# Patient Record
Sex: FEMALE | Race: AMERICAN INDIAN OR ALASKA NATIVE
[De-identification: names, ages, dates, MRNs, and addresses within clinical notes are randomized per-mention and may not be internally consistent; named-entity substitution may affect disease eponyms.]

---

## 2021-08-26 ENCOUNTER — HOSPITAL ENCOUNTER (INPATIENT)
Dept: HOSPITAL 5 - ED | Age: 51
LOS: 3 days | Discharge: HOME | DRG: 177 | End: 2021-08-29
Attending: INTERNAL MEDICINE | Admitting: HOSPITALIST
Payer: SELF-PAY

## 2021-08-26 DIAGNOSIS — I25.2: ICD-10-CM

## 2021-08-26 DIAGNOSIS — U07.1: Primary | ICD-10-CM

## 2021-08-26 DIAGNOSIS — E87.1: ICD-10-CM

## 2021-08-26 DIAGNOSIS — M06.9: ICD-10-CM

## 2021-08-26 DIAGNOSIS — J12.82: ICD-10-CM

## 2021-08-26 DIAGNOSIS — I25.10: ICD-10-CM

## 2021-08-26 DIAGNOSIS — I10: ICD-10-CM

## 2021-08-26 LAB
ALBUMIN SERPL-MCNC: 3.7 G/DL (ref 3.9–5)
ALT SERPL-CCNC: 10 UNITS/L (ref 7–56)
BASOPHILS # (AUTO): 0 K/MM3 (ref 0–0.1)
BASOPHILS NFR BLD AUTO: 0.4 % (ref 0–1.8)
BILIRUB DIRECT SERPL-MCNC: < 0.2 MG/DL (ref 0–0.2)
BUN SERPL-MCNC: 9 MG/DL (ref 7–17)
BUN/CREAT SERPL: 13 %
CALCIUM SERPL-MCNC: 9.1 MG/DL (ref 8.4–10.2)
CRP SERPL-MCNC: 5 MG/DL (ref 0–1.3)
EOSINOPHIL # BLD AUTO: 0 K/MM3 (ref 0–0.4)
EOSINOPHIL NFR BLD AUTO: 0 % (ref 0–4.3)
HCT VFR BLD CALC: 36.9 % (ref 30.3–42.9)
HEMOLYSIS INDEX: 8
HGB BLD-MCNC: 11.8 GM/DL (ref 10.1–14.3)
LYMPHOCYTES # BLD AUTO: 0.7 K/MM3 (ref 1.2–5.4)
LYMPHOCYTES NFR BLD AUTO: 19.4 % (ref 13.4–35)
MCHC RBC AUTO-ENTMCNC: 32 % (ref 30–34)
MCV RBC AUTO: 81 FL (ref 79–97)
MONOCYTES # (AUTO): 0.2 K/MM3 (ref 0–0.8)
MONOCYTES % (AUTO): 6.6 % (ref 0–7.3)
PLATELET # BLD: 199 K/MM3 (ref 140–440)
RBC # BLD AUTO: 4.54 M/MM3 (ref 3.65–5.03)

## 2021-08-26 PROCEDURE — 84145 PROCALCITONIN (PCT): CPT

## 2021-08-26 PROCEDURE — 80053 COMPREHEN METABOLIC PANEL: CPT

## 2021-08-26 PROCEDURE — 80048 BASIC METABOLIC PNL TOTAL CA: CPT

## 2021-08-26 PROCEDURE — 83615 LACTATE (LD) (LDH) ENZYME: CPT

## 2021-08-26 PROCEDURE — 83735 ASSAY OF MAGNESIUM: CPT

## 2021-08-26 PROCEDURE — 71046 X-RAY EXAM CHEST 2 VIEWS: CPT

## 2021-08-26 PROCEDURE — 85025 COMPLETE CBC W/AUTO DIFF WBC: CPT

## 2021-08-26 PROCEDURE — 86140 C-REACTIVE PROTEIN: CPT

## 2021-08-26 PROCEDURE — 82728 ASSAY OF FERRITIN: CPT

## 2021-08-26 PROCEDURE — 36415 COLL VENOUS BLD VENIPUNCTURE: CPT

## 2021-08-26 PROCEDURE — 80076 HEPATIC FUNCTION PANEL: CPT

## 2021-08-26 PROCEDURE — 85379 FIBRIN DEGRADATION QUANT: CPT

## 2021-08-26 PROCEDURE — U0003 INFECTIOUS AGENT DETECTION BY NUCLEIC ACID (DNA OR RNA); SEVERE ACUTE RESPIRATORY SYNDROME CORONAVIRUS 2 (SARS-COV-2) (CORONAVIRUS DISEASE [COVID-19]), AMPLIFIED PROBE TECHNIQUE, MAKING USE OF HIGH THROUGHPUT TECHNOLOGIES AS DESCRIBED BY CMS-2020-01-R: HCPCS

## 2021-08-26 RX ADMIN — Medication SCH MG: at 10:30

## 2021-08-26 RX ADMIN — OXYCODONE HYDROCHLORIDE AND ACETAMINOPHEN SCH MG: 500 TABLET ORAL at 10:30

## 2021-08-26 RX ADMIN — PANTOPRAZOLE SODIUM SCH MG: 40 TABLET, DELAYED RELEASE ORAL at 10:30

## 2021-08-26 NOTE — XRAY REPORT
CHEST 2 VIEWS 



INDICATION / CLINICAL INFORMATION:

sob and chest pain.





FINDINGS:



SUPPORT DEVICES: None.



HEART / MEDIASTINUM: No significant abnormality. 



LUNGS / PLEURA: Severe bilateral airspace pneumonia, lower lobe predominant. No significant pleural e
ffusion.



Signer Name: Josue Kline MD 

Signed: 8/26/2021 3:57 AM

Workstation Name: [id number]

## 2021-08-26 NOTE — HISTORY AND PHYSICAL REPORT
History of Present Illness


Date of examination: 21


Date of admission: 


21 05:59





Chief complaint: 


Fever, worsening shortness of breath, chest pain and generalized body aches for 

the last 1 week





History of present illness: 


51-year-old  female patient with significant past medical history of rheumatoid 

arthritis on Humira, hypertension , history of coronary artery disease/STEMI, 

undergoing evaluation for possible lupus presented to the emergency room with 

generalized body pains fever and worsening shortness of breath and chest pain of

1 week duration Patient gives history of COVID-19 exposures with her 10-year-old

son who was positive


On presentation patient is febrile with temperature of 101 F, initial chest x-

ray findings consistent with bilateral pneumonia


Patient also has mild leukopenia probably due to viral syndrome and mild 

hyponatremia


Patient complains of vague chest pain


Denies nausea vomiting or abdominal pain


Denies headache dizziness, denies palpitation

















Past History


Past Medical History: arthritis, hypertension, other (Rheumatoid arthritis, 

scoliosis)


Past Surgical History:  (X2)


Social history: denies: smoking, alcohol abuse, prescription drug abuse


Family history: no significant family history





Medications and Allergies


                                    Allergies











Allergy/AdvReac Type Severity Reaction Status Date / Time


 


No Known Allergies Allergy   Verified 21 03:05














Review of Systems


Constitutional: fever, fatigue, weakness


Ears, nose, mouth and throat: no nasal congestion, no nasal discharge


Cardiovascular: no chest pain, no orthopnea, no palpitations


Respiratory: cough, shortness of breath


Gastrointestinal: no abdominal pain, no nausea, no vomiting


Integumentary: no rash, no lesions


Neurological: weakness, no seizures, no syncope


Psychiatric: no anxiety, no depression


Endocrine: no cold intolerance, no heat intolerance, no polydipsia, no polyuria


Hematologic/Lymphatic: no easy bruising, no easy bleeding


Allergic/Immunologic: no urticaria, no allergic rhinitis





Exam





- Constitutional


Vitals: 


                                        











Temp Pulse Resp BP Pulse Ox


 


 101.0 F H  98 H  18   100/73   98 


 


 21 03:00  21 03:00  21 03:17  21 03:00  21 03:00











General appearance: Present: mild distress, well-nourished, obese





- EENT


Eyes: Present: PERRL, EOM intact





- Neck


Neck: Present: supple, normal ROM





- Respiratory


Respiratory effort: normal


Respiratory: bilateral: diminished, rhonchi, negative: rales, wheezing





- Cardiovascular


Rhythm: regular


Heart Sounds: Present: S1 & S2





- Extremities


Extremities: no ischemia, No edema





- Abdominal


General gastrointestinal: Present: soft, non-tender, non-distended, normal bowel

sounds





- Integumentary


Integumentary: Present: clear, warm





- Musculoskeletal


Musculoskeletal: strength equal bilaterally, generalized weakness





- Psychiatric


Psychiatric: appropriate mood/affect, cooperative





- Neurologic


Neurologic: CNII-XII intact, moves all extremities





Results





- Labs


CBC & Chem 7: 


                                 21 07:06





                                 21 07:06


Labs: 


                              Abnormal lab results











  21 Range/Units





  07:06 


 


WBC  3.5 L  (4.5-11.0)  K/mm3


 


MCH  26 L  (28-32)  pg


 


RDW  15.3 H  (13.2-15.2)  %


 


Lymph # (Auto)  0.7 L  (1.2-5.4)  K/mm3


 


Seg Neutrophils %  73.6 H  (40.0-70.0)  %














Assessment and Plan





- Patient Problems


(1) Person under investigation for COVID-19


Current Visit: Yes   Status: Acute   


Plan to address problem: 


High suspicion for COVID-19, as patient had exposure to cold contact


Contact and droplet isolation, Corona PCR test requested


ID consult if needed


Patient is not vaccinated








(2) Bilateral pneumonia


Current Visit: Yes   Status: Acute   


Plan to address problem: 


Probably atypical viral pneumonia


However we will start empiric antibiotics with Rocephin and Zithromax


Cultures, oxygen titrate O2 sats more than 90%, supportive care








(3) Obesity (BMI 30.0-34.9)


Current Visit: Yes   Status: Acute   


Plan to address problem: 


Patient needs weight reduction when medically stable


Diet modification, exercise as tolerated and weight reduction when stable








(4) Hypertension


Current Visit: Yes   Status: Acute   


Plan to address problem: 


Moderate control, continue antihypertensives


As needed hydralazine








(5) Leukocytopenia, unspecified


Current Visit: Yes   Status: Acute   


Plan to address problem: 


Acute viral syndrome, possible COVID-19 infection


Follow corona PCR test, closely monitor








(6) Hyponatremia


Current Visit: Yes   Status: Acute   


Plan to address problem: 


Mild hyponatremia, normal saline, closely monitor electrolytes








(7) Acute febrile illness


Current Visit: Yes   Status: Acute   


Plan to address problem: 


Acute febrile illness probably due to viral syndrome


Possible COVID-19 pending test








(8) Full code status


Current Visit: Yes   Status: Acute   





(9) DVT prophylaxis


Current Visit: Yes   Status: Acute   


Plan to address problem: 


Lovenox





Closely monitor the patient and adjust management as needed


Plan of care reviewed with the patient and her nurse

## 2021-08-26 NOTE — EMERGENCY DEPARTMENT REPORT
<LINDY MATIAS - Last Filed: 21 06:21>





ED Fever HPI





- General


Chief Complaint: Fever


Stated Complaint: VOMITING,FEVER,BODY PAIN X 1 WK


Time Seen by Provider: 21 06:13





- History of Present Illness


Initial Comments: 





51-year-old -American female   who recently retired from 

the [employer] with a past medical history of rheumatoid arthritis on Humira and 

subsequently vaccinated also past medical history of CAD with STEMI and 

hypertension history and current evaluation for what is suspected lupus presents

emergency department after contact with her Covid positive 10-year-old son 

reporting a 1 week history of progressive worsening chest pain, shortness of 

breath, fevers sensation and coryza and myalgia with a suspicion for the 

coronavirus.  Reports no hemoptysis no hematemesis no no hematochezia symptoms 

appear to be worse with ambulation/exertion.


Associated Symptoms: headache, shortness of breath, weakness





ED Review of Systems


Comment: All other systems reviewed and negative





ED Past Medical Hx





- Past Medical History


Previous Medical History?: Yes


Hx Hypertension: Yes


Additional medical history: rheumatoid arthritis, osteoarthritis, scoliosis





- Surgical History


Past Surgical History?: Yes


Additional Surgical History: c section x2





- Social History


Smoking Status: Never Smoker


Substance Use Type: None





ED Physical Exam





- General


Limitations: No Limitations


General appearance: alert, in distress (Mild)





- Head


Head exam: Present: atraumatic, normocephalic





- Eye


Eye exam: Present: normal appearance, PERRL, EOMI


Pupils: Present: normal accommodation





- ENT


ENT exam: Present: normal exam, normal orophraynx, mucous membranes moist





- Neck


Neck exam: Present: normal inspection, full ROM





- Respiratory


Respiratory exam: Present: rhonchi, decreased breath sounds.  Absent: 

respiratory distress





- Cardiovascular


Cardiovascular Exam: Present: regular rate, normal rhythm.  Absent: systolic 

murmur, diastolic murmur, rubs, gallop





- GI/Abdominal


GI/Abdominal exam: Present: soft, normal bowel sounds





- Extremities Exam


Extremities exam: Present: normal inspection





- Back Exam


Back exam: Present: normal inspection





- Neurological Exam


Neurological exam: Present: alert, oriented X3, CN II-XII intact





- Psychiatric


Psychiatric exam: Present: normal affect, normal mood





- Skin


Skin exam: Present: warm, dry, intact, normal color.  Absent: rash





ED Course





- Consultations


Consultation #1: 





21 06:23


Case was discussed with the hospitalist nurse practitioner about the need for 

admission due to her Covid 19 symptoms and bilateral pneumonia.  Plan is to 

admit to the hospitalist she reports that she will pass case along to the 

incoming doctor


Consultation #2: 





21 06:25


Case was discussed with the attending Dr. Mccracken who also had face-to-face 

with Ms. diaz and agrees with need for admission.  He examined the patient 

while at bedside.





ED Medical Decision Making





- Radiology Data


Radiology results: report reviewed





[facility], [place]  


                                     [address] [place] [address]  


 


                                            XRay Report   


                                               Signed  


 


Patient: BENJY DIAZ                                                        

       MR#: [id number]  


[id number]          


: 1970                                                                

Acct:[id number]      


 


Age/Sex: 51 / F                                                                

ADM Date: 21     


 


Loc: ED       


Attending Dr:   


 


 


Ordering Physician: DEMETRIO LAWRENCE  


Date of Service: 21  


Procedure(s): XR chest routine 2V  


Accession Number(s): [id number]  


 


cc: DEMETRIO LAWRENCE   


 


Fluoro Time In Minutes:   


 


CHEST 2 VIEWS   


 


 INDICATION / CLINICAL INFORMATION:  


 sob and chest pain.  


 


 


 FINDINGS:  


 


 SUPPORT DEVICES: None.  


 


 HEART / MEDIASTINUM: No significant abnormality.   


 


 LUNGS / PLEURA: Severe bilateral airspace pneumonia, lower lobe predominant. No

significant pleural


effusion.  


 


 Signer Name: Josue Kline MD   


 Signed: 2021 3:57 AM  


 Workstation Name: [id number]   


 


 


Transcribed By: BC  


Dictated By: Josue Kline MD  


Electronically Authenticated By: Josue Kline MD    


Signed Date/Time: 21                                


 


 


 


DD/DT: 21                                                            

 


TD/TT:























Print Cancel 





- Medical Decision Making





51-year-old female [facility] emergency department with shortness of breath chest 

pain ankle with exposure with a suspicion for having developed what appears to 

be the COVID-19 19 virus with complication of bilateral lower lobe pneumonia.  

She was treated emergency department with fluids antibiotics and steroids and 

she was ambulated with desaturations from all 100% down to 92 with labor teresa

thing and tachycardia.  Due to her immunocompromising history and cope at home 

comorbidities it is in the best interest for admission for aggressive therapy 

and observation.  She was evaluated also by the attending on duty whom agree 

with this plan this was discussed with the nurse practitioner for the admitting 

provider Dr. Oscar who reports she will pass it on to the incoming doctor





ED Disposition


Clinical Impression: 


 Person under investigation for COVID-19, Shortness of breath





Pneumonia of both lower lobes


Qualifiers:


 Pneumonia type: due to unspecified organism Qualified Code(s): J18.9 - 

Pneumonia, unspecified organism





Bilateral pneumonia


Qualifiers:


 Pneumonia type: due to unspecified organism Lung location: unspecified part of 

lung Qualified Code(s): J18.9 - Pneumonia, unspecified organism





Chest pain


Qualifiers:


 Chest pain type: unspecified Qualified Code(s): R07.9 - Chest pain, unspecified





Disposition:  ADMITTED AS INPATIENT


Is pt being admited?: Yes


Does the pt Need Aspirin: No


Condition: Critical





<ADRY MCCRACKEN III - Last Filed: 21 21:52>





ED Fever HPI





- General


PUI?: Yes


Source: patient


Exam Limitations: no limitations





ED Review of Systems


ROS: 


Stated complaint: VOMITING,FEVER,BODY PAIN X 1 WK


Other details as noted in HPI








ED Past Medical Hx





- Past Medical History


Previous Medical History?: Yes


Hx Hypertension: Yes


Hx Heart Attack/AMI: Yes





- Surgical History


Past Surgical History?: Yes





- Family History


Family history: no significant





- Social History


Smoking Status: Never Smoker


Substance Use Type: None





ED Course





                                   Vital Signs











  21





  03:00 03:01 03:17


 


Temperature 101.0 F H  


 


Pulse Rate 98 H  


 


Respiratory 20  18





Rate   


 


Blood Pressure 96/71 100/73 


 


Blood Pressure 100/73  





[Left]   


 


O2 Sat by Pulse 98  





Oximetry   














- Reevaluation(s)


Reevaluation #1: 


I reviewed the findings and management of this patient in real-time and I have 

personally seen and examined this patient and participated in the decision 

making for this patient with the midlevel.  Patient is a 51-year-old female with

 a history of rheumatoid arthritis, hypertension and CAD and MI/NSTEMI.  Patient

 presents emergency room with complaints of chest pain, shortness of breath, 

cough, fever and respiratory symptoms.  Patient states she is not vaccinated 

against COVID-19.  Patient states that she is currently on Humira which is a 

biologic and immunosuppressive for her rheumatoid arthritis.  Patient ambulated 

with the nurse and the oxygen dropped down to 92%.  Patient's oxygen saturation 

is concerning.  Patient is presumed high risk due to being on 

immunosuppressive's with the Humira.  Patient had a chest x-ray and it shows 

bilateral infiltrates and pneumonia.  Patient will have labs done and the 

patient will prepare for admission.





I discussed all results with patient.  I discussed plan of care with patient.  

Patient agrees with plan of care and admission.  Patient to be admitted to the 

hospitalist service.





The midlevel will consult the hospitalist service for admission after labs are 

done and the work-up is complete.  Patient will have a Covid panel. Patient was 

placed on oxygen.





I examined the patient.  Patient's lung sounds are diminished with rhonchi.  

Patient CV exam shows normal S1-S2,  No murmurs noted.  Patient's neurologic 

exam is intact and the patient's alert and oriented x4.


21 06:10














ED Medical Decision Making





- Lab Data


Result diagrams: 


                                 21 07:06





                                 21 07:06





- Radiology Data


Radiology results: report reviewed, image reviewed


interpreted by me: 





Chest x-ray: Bilateral pneumonia, no pneumothorax, no foreign body, no osseous 

findings, 











- Differential Diagnosis


PUI, Covid, chest pain, shortness of breath, fever, URI, pneumonia


Critical care attestation.: 


If time is entered above; I have spent that time in minutes in the direct care 

of this critically ill patient, excluding procedure time.








ED Disposition


Is pt being admited?: Yes


Does the pt Need Aspirin: No


Time of Disposition: 06:10

## 2021-08-27 LAB
ALBUMIN SERPL-MCNC: 3.5 G/DL (ref 3.9–5)
ALT SERPL-CCNC: 9 UNITS/L (ref 7–56)
BASOPHILS # (AUTO): 0 K/MM3 (ref 0–0.1)
BASOPHILS NFR BLD AUTO: 0.2 % (ref 0–1.8)
BUN SERPL-MCNC: 11 MG/DL (ref 7–17)
BUN/CREAT SERPL: 16 %
CALCIUM SERPL-MCNC: 9.4 MG/DL (ref 8.4–10.2)
EOSINOPHIL # BLD AUTO: 0 K/MM3 (ref 0–0.4)
EOSINOPHIL NFR BLD AUTO: 0 % (ref 0–4.3)
HCT VFR BLD CALC: 36.5 % (ref 30.3–42.9)
HEMOLYSIS INDEX: 4
HGB BLD-MCNC: 11.7 GM/DL (ref 10.1–14.3)
LYMPHOCYTES # BLD AUTO: 1 K/MM3 (ref 1.2–5.4)
LYMPHOCYTES NFR BLD AUTO: 14.7 % (ref 13.4–35)
MCHC RBC AUTO-ENTMCNC: 32 % (ref 30–34)
MCV RBC AUTO: 81 FL (ref 79–97)
MONOCYTES # (AUTO): 0.5 K/MM3 (ref 0–0.8)
MONOCYTES % (AUTO): 7.2 % (ref 0–7.3)
PLATELET # BLD: 218 K/MM3 (ref 140–440)
RBC # BLD AUTO: 4.5 M/MM3 (ref 3.65–5.03)

## 2021-08-27 RX ADMIN — OXYCODONE HYDROCHLORIDE AND ACETAMINOPHEN SCH MG: 500 TABLET ORAL at 00:34

## 2021-08-27 RX ADMIN — AZITHROMYCIN SCH MLS/HR: 500 INJECTION, POWDER, LYOPHILIZED, FOR SOLUTION INTRAVENOUS at 08:15

## 2021-08-27 RX ADMIN — Medication SCH MG: at 23:36

## 2021-08-27 RX ADMIN — Medication SCH MG: at 10:15

## 2021-08-27 RX ADMIN — ENOXAPARIN SODIUM SCH MG: 100 INJECTION SUBCUTANEOUS at 00:41

## 2021-08-27 RX ADMIN — PANTOPRAZOLE SODIUM SCH MG: 40 TABLET, DELAYED RELEASE ORAL at 07:54

## 2021-08-27 RX ADMIN — ENOXAPARIN SODIUM SCH MG: 100 INJECTION SUBCUTANEOUS at 23:36

## 2021-08-27 RX ADMIN — Medication SCH MG: at 00:34

## 2021-08-27 RX ADMIN — CEFTRIAXONE SODIUM SCH MLS/HR: 2 INJECTION, POWDER, FOR SOLUTION INTRAMUSCULAR; INTRAVENOUS at 08:14

## 2021-08-27 RX ADMIN — OXYCODONE HYDROCHLORIDE AND ACETAMINOPHEN SCH MG: 500 TABLET ORAL at 23:36

## 2021-08-27 RX ADMIN — OXYCODONE HYDROCHLORIDE AND ACETAMINOPHEN SCH MG: 500 TABLET ORAL at 10:15

## 2021-08-27 NOTE — PROGRESS NOTE
Assessment and Plan


Assessment and plan: 


-- positive COVID-19 infection


Current Visit: Yes   Status: Acute   


Contact and droplet isolation


Check inflammatory markers


Oxygen evaluation, currently on room air


No hypoxia , no need for steroids and remdesivir at this point


ID consult if needed,Prone positioning





-- Bilateral pneumonia


Current Visit: Yes   Status: Acute    


Probably atypical viral pneumonia


However we will start empiric antibiotics with Rocephin and Zithromax


Cultures, oxygen titrate O2 sats more than 90%, supportive care


Check procalcitonin, if normal DC antibiotics





--Obesity (BMI 30.0-34.9)


Current Visit: Yes   Status: Acute   


Patient needs weight reduction when medically stable


Diet modification, exercise as tolerated and weight reduction when stable





--Hypertension


Current Visit: Yes   Status: Acute   


Moderate control, continue antihypertensives


As needed hydralazine





--Leukocytopenia, unspecified


Current Visit: Yes   Status: Acute   


Acute viral syndrome, possible COVID-19 infection


Follow corona PCR test, closely monitor





--Hyponatremia


Current Visit: Yes   Status: Acute   


Mild hyponatremia, normal saline, closely monitor electrolytes





--Acute febrile illness


Current Visit: Yes   Status: Acute   


Acute febrile illness probably due to viral syndrome


Possible COVID-19 pending test





--Full code status


Current Visit: Yes   Status: Acute   





--DVT prophylaxis


Current Visit: Yes   Status: Acute   


Lovenox





Closely monitor the patient and adjust management as needed


Plan of care reviewed with the patient and her nurse





8/27/2021;


Patient has intermittent fevers


No hypoxia, on room air O2


Pneumonia, on empiric antibiotics


Procalcitonin requested, not reported


ID consult if needed














History


Interval history: 


I have seen and examined the patient at the bedside this morning in ER awaiting 

room assignment


Patient is not on any oxygen, saturating well on room air


However patient has intermittent fevers


No other complaints


Vital signs noted








Hospitalist Physical





- Constitutional


Vitals: 


                                        











Temp Pulse Resp BP Pulse Ox


 


 101.0 F H  98 H  18   100/73   98 


 


 08/26/21 03:00  08/26/21 03:00  08/26/21 03:17  08/26/21 03:01  08/26/21 03:00











General appearance: Present: mild distress, well-nourished, obese





- EENT


Eyes: Present: PERRL, EOM intact





- Neck


Neck: Present: supple, normal ROM





- Respiratory


Respiratory effort: normal


Respiratory: bilateral: diminished, negative: rales, rhonchi, wheezing





- Cardiovascular


Rhythm: regular


Heart Sounds: Present: S1 & S2





- Extremities


Extremities: no ischemia, No edema





- Abdominal


General gastrointestinal: soft, non-tender, non-distended, normal bowel sounds





- Integumentary


Integumentary: Present: clear, warm





- Psychiatric


Psychiatric: appropriate mood/affect, cooperative





- Neurologic


Neurologic: CNII-XII intact, moves all extremities





Results





- Labs


CBC & Chem 7: 


                                 08/27/21 05:53





                                 08/27/21 05:53


Labs: 


                             Laboratory Last Values











WBC  7.1 K/mm3 (4.5-11.0)   08/27/21  05:53    


 


RBC  4.50 M/mm3 (3.65-5.03)   08/27/21  05:53    


 


Hgb  11.7 gm/dl (10.1-14.3)   08/27/21  05:53    


 


Hct  36.5 % (30.3-42.9)   08/27/21  05:53    


 


MCV  81 fl (79-97)   08/27/21  05:53    


 


MCH  26 pg (28-32)  L  08/27/21  05:53    


 


MCHC  32 % (30-34)   08/27/21  05:53    


 


RDW  15.2 % (13.2-15.2)   08/27/21  05:53    


 


Plt Count  218 K/mm3 (140-440)   08/27/21  05:53    


 


Lymph % (Auto)  14.7 % (13.4-35.0)   08/27/21  05:53    


 


Mono % (Auto)  7.2 % (0.0-7.3)   08/27/21  05:53    


 


Eos % (Auto)  0.0 % (0.0-4.3)   08/27/21  05:53    


 


Baso % (Auto)  0.2 % (0.0-1.8)   08/27/21  05:53    


 


Lymph # (Auto)  1.0 K/mm3 (1.2-5.4)  L  08/27/21  05:53    


 


Mono # (Auto)  0.5 K/mm3 (0.0-0.8)   08/27/21  05:53    


 


Eos # (Auto)  0.0 K/mm3 (0.0-0.4)   08/27/21  05:53    


 


Baso # (Auto)  0.0 K/mm3 (0.0-0.1)   08/27/21  05:53    


 


Seg Neutrophils %  77.9 % (40.0-70.0)  H  08/27/21  05:53    


 


Seg Neutrophils #  5.6 K/mm3 (1.8-7.7)   08/27/21  05:53    


 


D-Dimer  196.59 ng/mlDDU (0-234)   08/26/21  07:06    


 


Sodium  138 mmol/L (137-145)   08/27/21  05:53    


 


Potassium  4.3 mmol/L (3.6-5.0)   08/27/21  05:53    


 


Chloride  101.0 mmol/L ()   08/27/21  05:53    


 


Carbon Dioxide  27 mmol/L (22-30)   08/27/21  05:53    


 


Anion Gap  14 mmol/L  08/27/21  05:53    


 


BUN  11 mg/dL (7-17)   08/27/21  05:53    


 


Creatinine  0.7 mg/dL (0.6-1.2)   08/27/21  05:53    


 


Estimated GFR  > 60 ml/min  08/27/21  05:53    


 


BUN/Creatinine Ratio  16 %  08/27/21  05:53    


 


Glucose  115 mg/dL ()  H  08/27/21  05:53    


 


Calcium  9.4 mg/dL (8.4-10.2)   08/27/21  05:53    


 


Magnesium  2.20 mg/dL (1.7-2.3)   08/27/21  05:53    


 


Ferritin  82.8 ng/mL (10.0-200.0)   08/26/21  07:06    


 


Total Bilirubin  0.40 mg/dL (0.1-1.2)   08/27/21  05:53    


 


Direct Bilirubin  < 0.2 mg/dL (0-0.2)   08/26/21  07:06    


 


Indirect Bilirubin  0.2 mg/dL  08/26/21  07:06    


 


AST  19 units/L (5-40)   08/27/21  05:53    


 


ALT  9 units/L (7-56)   08/27/21  05:53    


 


Alkaline Phosphatase  61 units/L ()   08/27/21  05:53    


 


Lactate Dehydrogenase  293 units/L ()  H  08/26/21  07:06    


 


C-Reactive Protein  5.00 mg/dL (0.00-1.30)  H  08/26/21  07:06    


 


Total Protein  7.2 g/dL (6.3-8.2)   08/27/21  05:53    


 


Albumin  3.5 g/dL (3.9-5)  L  08/27/21  05:53    


 


Albumin/Globulin Ratio  0.9 %  08/27/21  05:53    


 


Coronavirus (PCR)  Positive  (Negative)  A  08/26/21  Unknown














Active Medications





- Current Medications


Current Medications: 














Generic Name Dose Route Start Last Admin





  Trade Name Freq  PRN Reason Stop Dose Admin


 


Acetaminophen  650 mg  08/26/21 08:30 





  Acetaminophen 325 Mg Tab  PO  





  Q4H PRN  





  Pain, Mild (1-3)  


 


Ascorbic Acid  500 mg  08/26/21 10:00  08/27/21 10:15





  Ascorbic Acid 500 Mg Tab  PO   500 mg





  BID LIZ   Administration


 


Enoxaparin Sodium  40 mg  08/26/21 22:00  08/27/21 00:41





  Enoxaparin 40 Mg/0.4 Ml Inj  SUB-Q   40 mg





  QDAY@2200 LIZ   Administration





  Protocol  


 


Azithromycin  500 mg in 250 mls @ 250 mls/hr  08/27/21 08:00  08/27/21 08:15





  Zithromax/Ns  IV  08/30/21 08:59  250 mls/hr





  Q24H LIZ   Administration


 


Ceftriaxone Sodium  2 gm in 100 mls @ 200 mls/hr  08/27/21 08:00  08/27/21 08:14





  Rocephin/Ns 2 Gm/100 Ml  IV  08/30/21 08:29  200 mls/hr





  Q24H LIZ   Administration





  Protocol  


 


Sodium Chloride  1,000 mls @ 75 mls/hr  08/26/21 08:30  08/27/21 00:29





  Nacl 0.9% 1000 Ml  IV   75 mls/hr





  AS DIRECT LIZ   Administration


 


Pantoprazole Sodium  40 mg  08/26/21 09:00  08/27/21 07:54





  Pantoprazole 40 Mg Tab  PO   40 mg





  QDAC LIZ   Administration


 


Zinc Sulfate  220 mg  08/26/21 10:00  08/27/21 10:15





  Zinc Sulfate 220 Mg Cap  PO   220 mg





  BID LIZ   Administration

## 2021-08-28 RX ADMIN — PANTOPRAZOLE SODIUM SCH MG: 40 TABLET, DELAYED RELEASE ORAL at 09:19

## 2021-08-28 RX ADMIN — Medication SCH MG: at 10:13

## 2021-08-28 RX ADMIN — OXYCODONE HYDROCHLORIDE AND ACETAMINOPHEN SCH MG: 500 TABLET ORAL at 10:13

## 2021-08-28 RX ADMIN — CEFTRIAXONE SODIUM SCH MLS/HR: 2 INJECTION, POWDER, FOR SOLUTION INTRAMUSCULAR; INTRAVENOUS at 09:19

## 2021-08-28 RX ADMIN — Medication SCH MG: at 21:20

## 2021-08-28 RX ADMIN — OXYCODONE HYDROCHLORIDE AND ACETAMINOPHEN SCH MG: 500 TABLET ORAL at 21:20

## 2021-08-28 RX ADMIN — ENOXAPARIN SODIUM SCH MG: 100 INJECTION SUBCUTANEOUS at 21:20

## 2021-08-28 RX ADMIN — AZITHROMYCIN SCH MLS/HR: 500 INJECTION, POWDER, LYOPHILIZED, FOR SOLUTION INTRAVENOUS at 09:20

## 2021-08-28 NOTE — PROGRESS NOTE
Assessment and Plan


Assessment and plan: 


-- positive COVID-19 infection


Current Visit: Yes   Status: Acute   


Contact and droplet isolation


Check inflammatory markers


currently on room air


No hypoxia , no need for steroids and remdesivir at this point


ID consult if needed,Prone positioning





-- Bilateral pneumonia


Current Visit: Yes   Status: Acute    


Probably atypical viral pneumonia


However we will start empiric antibiotics with Rocephin and Zithromax


Cultures, oxygen titrate O2 sats more than 90%, supportive care


Procalcitonin very high, will complete antibiotics for total 5 days





--Obesity (BMI 30.0-34.9)


Current Visit: Yes   Status: Acute   


Patient needs weight reduction when medically stable


Diet modification, exercise as tolerated and weight reduction when stable





--Hypertension


Current Visit: Yes   Status: Acute   


Moderate control, continue antihypertensives


As needed hydralazine





--Leukocytopenia, unspecified


Current Visit: Yes   Status: Acute   


Acute viral syndrome, possible COVID-19 infection


Follow corona PCR test, closely monitor





--Hyponatremia


Current Visit: Yes   Status: Acute   


Mild hyponatremia, normal saline, closely monitor electrolytes





--Acute febrile illness


Current Visit: Yes   Status: Acute   


Acute febrile illness probably due to viral syndrome


Possible COVID-19 pending test





--Full code status


Current Visit: Yes   Status: Acute   





--DVT prophylaxis


Current Visit: Yes   Status: Acute   


Lovenox





Closely monitor the patient and adjust management as needed


Plan of care reviewed with the patient and her nurse





8/27/2021;


Patient has intermittent fevers


No hypoxia, on room air O2


Pneumonia, on empiric antibiotics


Procalcitonin requested, not reported


ID consult if needed





8/28/2021; patient feels better


Intermittent fevers, pneumonia community-acquired


Procalcitonin very high, continue antibiotics for total 5 to 7 days


Follow cultures


Patient is not requiring any supplemental oxygen at this point


Will check 6-minute walk and home O2 evaluation at discharge














History


Interval history: 


I seen and examined the patient at the bedside


Patient's chart and medications reviewed


Patient feels slightly better still has intermittent fevers in mild distress


Vital signs noted








Hospitalist Physical





- Constitutional


Vitals: 


                                        











Temp Pulse Resp BP Pulse Ox


 


 101.0 F H  98 H  16   100/73   9 L


 


 08/26/21 03:00  08/26/21 03:00  08/27/21 07:30  08/26/21 03:01  08/28/21 17:15











General appearance: Present: mild distress, well-nourished, obese





- EENT


Eyes: Present: PERRL, EOM intact





- Neck


Neck: Present: supple, normal ROM





- Respiratory


Respiratory effort: normal, labored


Respiratory: bilateral: diminished, negative: rales, rhonchi, wheezing





- Cardiovascular


Rhythm: regular


Heart Sounds: Present: S1 & S2





- Extremities


Extremities: no ischemia, No edema





- Abdominal


General gastrointestinal: soft, non-tender, non-distended, normal bowel sounds





- Integumentary


Integumentary: Present: clear, warm





- Psychiatric


Psychiatric: appropriate mood/affect, cooperative





- Neurologic


Neurologic: CNII-XII intact, no focal deficits





Results





- Labs


CBC & Chem 7: 


                                 08/27/21 05:53





                                 08/27/21 05:53


Labs: 


                             Laboratory Last Values











WBC  7.1 K/mm3 (4.5-11.0)   08/27/21  05:53    


 


RBC  4.50 M/mm3 (3.65-5.03)   08/27/21  05:53    


 


Hgb  11.7 gm/dl (10.1-14.3)   08/27/21  05:53    


 


Hct  36.5 % (30.3-42.9)   08/27/21  05:53    


 


MCV  81 fl (79-97)   08/27/21  05:53    


 


MCH  26 pg (28-32)  L  08/27/21  05:53    


 


MCHC  32 % (30-34)   08/27/21  05:53    


 


RDW  15.2 % (13.2-15.2)   08/27/21  05:53    


 


Plt Count  218 K/mm3 (140-440)   08/27/21  05:53    


 


Lymph % (Auto)  14.7 % (13.4-35.0)   08/27/21  05:53    


 


Mono % (Auto)  7.2 % (0.0-7.3)   08/27/21  05:53    


 


Eos % (Auto)  0.0 % (0.0-4.3)   08/27/21  05:53    


 


Baso % (Auto)  0.2 % (0.0-1.8)   08/27/21  05:53    


 


Lymph # (Auto)  1.0 K/mm3 (1.2-5.4)  L  08/27/21  05:53    


 


Mono # (Auto)  0.5 K/mm3 (0.0-0.8)   08/27/21  05:53    


 


Eos # (Auto)  0.0 K/mm3 (0.0-0.4)   08/27/21  05:53    


 


Baso # (Auto)  0.0 K/mm3 (0.0-0.1)   08/27/21  05:53    


 


Seg Neutrophils %  77.9 % (40.0-70.0)  H  08/27/21  05:53    


 


Seg Neutrophils #  5.6 K/mm3 (1.8-7.7)   08/27/21  05:53    


 


D-Dimer  196.59 ng/mlDDU (0-234)   08/26/21  07:06    


 


Sodium  138 mmol/L (137-145)   08/27/21  05:53    


 


Potassium  4.3 mmol/L (3.6-5.0)   08/27/21  05:53    


 


Chloride  101.0 mmol/L ()   08/27/21  05:53    


 


Carbon Dioxide  27 mmol/L (22-30)   08/27/21  05:53    


 


Anion Gap  14 mmol/L  08/27/21  05:53    


 


BUN  11 mg/dL (7-17)   08/27/21  05:53    


 


Creatinine  0.7 mg/dL (0.6-1.2)   08/27/21  05:53    


 


Estimated GFR  > 60 ml/min  08/27/21  05:53    


 


BUN/Creatinine Ratio  16 %  08/27/21  05:53    


 


Glucose  115 mg/dL ()  H  08/27/21  05:53    


 


Calcium  9.4 mg/dL (8.4-10.2)   08/27/21  05:53    


 


Magnesium  2.20 mg/dL (1.7-2.3)   08/27/21  05:53    


 


Ferritin  82.8 ng/mL (10.0-200.0)   08/26/21  07:06    


 


Total Bilirubin  0.40 mg/dL (0.1-1.2)   08/27/21  05:53    


 


Direct Bilirubin  < 0.2 mg/dL (0-0.2)   08/26/21  07:06    


 


Indirect Bilirubin  0.2 mg/dL  08/26/21  07:06    


 


AST  19 units/L (5-40)   08/27/21  05:53    


 


ALT  9 units/L (7-56)   08/27/21  05:53    


 


Alkaline Phosphatase  61 units/L ()   08/27/21  05:53    


 


Lactate Dehydrogenase  293 units/L ()  H  08/26/21  07:06    


 


C-Reactive Protein  5.00 mg/dL (0.00-1.30)  H  08/26/21  07:06    


 


Total Protein  7.2 g/dL (6.3-8.2)   08/27/21  05:53    


 


Albumin  3.5 g/dL (3.9-5)  L  08/27/21  05:53    


 


Albumin/Globulin Ratio  0.9 %  08/27/21  05:53    


 


Procalcitonin  8.55 ng/mL (<0.15)   08/26/21  07:06    


 


Coronavirus (PCR)  Positive  (Negative)  A  08/26/21  Unknown














Active Medications





- Current Medications


Current Medications: 














Generic Name Dose Route Start Last Admin





  Trade Name Freq  PRN Reason Stop Dose Admin


 


Acetaminophen  650 mg  08/26/21 08:30 





  Acetaminophen 325 Mg Tab  PO  





  Q4H PRN  





  Pain, Mild (1-3)  


 


Ascorbic Acid  500 mg  08/26/21 10:00  08/28/21 10:13





  Ascorbic Acid 500 Mg Tab  PO   500 mg





  BID LIZ   Administration


 


Enoxaparin Sodium  40 mg  08/26/21 22:00  08/27/21 23:36





  Enoxaparin 40 Mg/0.4 Ml Inj  SUB-Q   40 mg





  QDAY@2200 LIZ   Administration





  Protocol  


 


Azithromycin  500 mg in 250 mls @ 250 mls/hr  08/27/21 08:00  08/28/21 09:20





  Zithromax/Ns  IV  08/30/21 08:59  250 mls/hr





  Q24H LIZ   Administration


 


Ceftriaxone Sodium  2 gm in 100 mls @ 200 mls/hr  08/27/21 08:00  08/28/21 09:19





  Rocephin/Ns 2 Gm/100 Ml  IV  08/30/21 08:29  200 mls/hr





  Q24H LIZ   Administration





  Protocol  


 


Sodium Chloride  1,000 mls @ 75 mls/hr  08/26/21 08:30  08/27/21 00:29





  Nacl 0.9% 1000 Ml  IV   75 mls/hr





  AS DIRECT LIZ   Administration


 


Pantoprazole Sodium  40 mg  08/26/21 09:00  08/28/21 09:19





  Pantoprazole 40 Mg Tab  PO   40 mg





  QDAC LIZ   Administration


 


Zinc Sulfate  220 mg  08/26/21 10:00  08/28/21 10:13





  Zinc Sulfate 220 Mg Cap  PO   220 mg





  BID LIZ   Administration

## 2021-08-29 VITALS — SYSTOLIC BLOOD PRESSURE: 117 MMHG | DIASTOLIC BLOOD PRESSURE: 80 MMHG

## 2021-08-29 RX ADMIN — AZITHROMYCIN SCH MLS/HR: 500 INJECTION, POWDER, LYOPHILIZED, FOR SOLUTION INTRAVENOUS at 07:18

## 2021-08-29 RX ADMIN — OXYCODONE HYDROCHLORIDE AND ACETAMINOPHEN SCH MG: 500 TABLET ORAL at 10:16

## 2021-08-29 RX ADMIN — PANTOPRAZOLE SODIUM SCH MG: 40 TABLET, DELAYED RELEASE ORAL at 07:18

## 2021-08-29 RX ADMIN — Medication SCH MG: at 10:16

## 2021-08-29 RX ADMIN — CEFTRIAXONE SODIUM SCH MLS/HR: 2 INJECTION, POWDER, FOR SOLUTION INTRAMUSCULAR; INTRAVENOUS at 07:18

## 2021-08-29 NOTE — DISCHARGE SUMMARY
Providers





- Providers


Date of Admission: 


08/26/21 05:59





Date of discharge: 08/29/21


Attending physician: 


AMY J KOCHERLA





                                        





08/28/21 17:33


Consult to Dietitian/Nutrition [CONS] Routine 


   Physician Instructions: 


   Reason For Exam: 


   Reason for Consult: Poor oral intake











Primary care physician: 


PRIMARY CARE MD








Hospitalization


Condition: Critical





Exam





- Constitutional


Vitals: 


                                        











Temp Pulse Resp BP Pulse Ox


 


 98.8 F   73   20   125/85   96 


 


 08/28/21 23:34  08/28/21 23:34  08/28/21 23:34  08/28/21 23:34  08/29/21 00:01














Plan


Activity: no restrictions


Diet: regular


Additional Instructions: Advised to follow COVID-19 precautions and protocols 

instructed to you by the discharge nurse.  If you have worsening symptoms 

contact MD or go to the nearest emergency room.  Advised to follow-up primary 

care physician in 5 to 7 days.  Your oxygen saturation resting room air and 

ambulatory room air are more than 96%, no indication for home oxygen


Prescriptions: 


Pantoprazole [Protonix TAB] 40 mg PO QDAC #14 tablet


Ascorbic Acid [Vitamin C] 500 mg PO BID #30 tablet


Zinc Sulfate 220 mg PO BID #30 capsule